# Patient Record
Sex: FEMALE | Race: WHITE
[De-identification: names, ages, dates, MRNs, and addresses within clinical notes are randomized per-mention and may not be internally consistent; named-entity substitution may affect disease eponyms.]

---

## 2018-02-28 ENCOUNTER — HOSPITAL ENCOUNTER (EMERGENCY)
Dept: HOSPITAL 25 - ED | Age: 48
Discharge: HOME | End: 2018-02-28
Payer: COMMERCIAL

## 2018-02-28 VITALS — DIASTOLIC BLOOD PRESSURE: 78 MMHG | SYSTOLIC BLOOD PRESSURE: 127 MMHG

## 2018-02-28 DIAGNOSIS — F17.200: ICD-10-CM

## 2018-02-28 DIAGNOSIS — R00.2: ICD-10-CM

## 2018-02-28 DIAGNOSIS — Z88.2: ICD-10-CM

## 2018-02-28 DIAGNOSIS — Z88.0: ICD-10-CM

## 2018-02-28 DIAGNOSIS — R07.9: ICD-10-CM

## 2018-02-28 DIAGNOSIS — R03.0: Primary | ICD-10-CM

## 2018-02-28 LAB
BASOPHILS # BLD AUTO: 0 10^3/UL (ref 0–0.2)
EOSINOPHIL # BLD AUTO: 0 10^3/UL (ref 0–0.6)
HCT VFR BLD AUTO: 48 % (ref 35–47)
HGB BLD-MCNC: 16.4 G/DL (ref 12–16)
INR PPP/BLD: 0.85 (ref 0.77–1.02)
LYMPHOCYTES # BLD AUTO: 1.1 10^3/UL (ref 1–4.8)
MCH RBC QN AUTO: 33 PG (ref 27–31)
MCHC RBC AUTO-ENTMCNC: 34 G/DL (ref 31–36)
MCV RBC AUTO: 96 FL (ref 80–97)
MONOCYTES # BLD AUTO: 0.3 10^3/UL (ref 0–0.8)
NEUTROPHILS # BLD AUTO: 3.6 10^3/UL (ref 1.5–7.7)
NRBC # BLD AUTO: 0 10^3/UL
NRBC BLD QL AUTO: 0
PLATELET # BLD AUTO: 135 10^3/UL (ref 150–450)
RBC # BLD AUTO: 4.99 10^6/UL (ref 4–5.4)
WBC # BLD AUTO: 5 10^3/UL (ref 3.5–10.8)

## 2018-02-28 PROCEDURE — 83690 ASSAY OF LIPASE: CPT

## 2018-02-28 PROCEDURE — 83735 ASSAY OF MAGNESIUM: CPT

## 2018-02-28 PROCEDURE — 80053 COMPREHEN METABOLIC PANEL: CPT

## 2018-02-28 PROCEDURE — 83605 ASSAY OF LACTIC ACID: CPT

## 2018-02-28 PROCEDURE — 85730 THROMBOPLASTIN TIME PARTIAL: CPT

## 2018-02-28 PROCEDURE — 71045 X-RAY EXAM CHEST 1 VIEW: CPT

## 2018-02-28 PROCEDURE — 96360 HYDRATION IV INFUSION INIT: CPT

## 2018-02-28 PROCEDURE — 85379 FIBRIN DEGRADATION QUANT: CPT

## 2018-02-28 PROCEDURE — 36415 COLL VENOUS BLD VENIPUNCTURE: CPT

## 2018-02-28 PROCEDURE — 93005 ELECTROCARDIOGRAM TRACING: CPT

## 2018-02-28 PROCEDURE — 85610 PROTHROMBIN TIME: CPT

## 2018-02-28 PROCEDURE — 83880 ASSAY OF NATRIURETIC PEPTIDE: CPT

## 2018-02-28 PROCEDURE — 99282 EMERGENCY DEPT VISIT SF MDM: CPT

## 2018-02-28 PROCEDURE — 84484 ASSAY OF TROPONIN QUANT: CPT

## 2018-02-28 PROCEDURE — 85025 COMPLETE CBC W/AUTO DIFF WBC: CPT

## 2018-02-28 PROCEDURE — 86140 C-REACTIVE PROTEIN: CPT

## 2018-02-28 PROCEDURE — 84443 ASSAY THYROID STIM HORMONE: CPT

## 2018-02-28 NOTE — RAD
HISTORY: Chest pain, palpitation



COMPARISONS: None



VIEWS: 1: frontal portable view of the chest at 11:20 AM



FINDINGS:

LINES AND TUBES: None.

CARDIOMEDIASTINAL SILHOUETTE: The cardiomediastinal silhouette is normal for portable

technique.

PLEURA: The costophrenic angles are sharp. No pleural abnormalities are noted.

LUNG PARENCHYMA: The lungs are clear.

ABDOMEN: The upper abdomen is clear. There is no subphrenic gas.

BONES AND SOFT TISSUES: No bone or soft tissue abnormalities are noted.



IMPRESSION: NO ACTIVE CARDIOPULMONARY DISEASE.

## 2018-02-28 NOTE — ED
Eleni VAZQUEZ Thomas, scribed for Frandy Shanks MD on 02/28/18 at 1052 .





HPI Chest Pain





- HPI Summary


HPI Summary: 





The patient is a 47 year old female who complains of dizziness, diaphoresis, 

lightheadedness, and feelings of faintness that began this morning at 09:00 

about two hours after she woke up. She measured her blood pressure and it was 

171/115. She decided to present to the emergency department, and in the ED she 

developed chest pressure. This morning at 01:30, she reports I woke up because 

my heart was going very fast. Then it abruptly stops. She was able to fall 

back asleep. She notes similar prior episodes of palpitations that are 

concurrent with chest pressure. The patient does admit that she has been 

stressed at work. Earlier today, she took ASA 81. She denies edema, cough, and 

nasal congestion. She is an active smoker. The patient notes pain for the last 

week to the region of a varicose vein on her left thigh after an incident with 

her cat. 





- History of Current Complaint


Chief Complaint: EDChestPainROMI


Time Seen by Provider: 02/28/18 10:34


Hx Obtained From: Patient


Onset/Duration: Started Minutes Ago, Still Present


Timing: Constant


Current Severity: Moderate


Pain Intensity: 0


Pain Scale Used: 0-10 Numeric


Character: Pressure/Squeezing


Aggravating Factor(s): Nothing


Alleviating Factor(s): Nothing


Associated Signs and Symptoms: Positive: Other: - CP, dizziness, diaphoresis, 

lightheadedness, faintness, palpitations; NEGATIVE: edema, cough, nasal 

congestion





- Allergy/Home Medications


Allergies/Adverse Reactions: 


 Allergies











Allergy/AdvReac Type Severity Reaction Status Date / Time


 


Penicillins Allergy  Hives Verified 02/28/18 10:26


 


Sulfa (Sulfonamide Allergy  Wheezing Verified 02/28/18 10:26





Antibiotics)     














PMH/Surg Hx/FS Hx/Imm Hx


Endocrine/Hematology History: 


   Denies: Hx Diabetes


Cardiovascular History: 


   Denies: Hx Hypertension


Infectious Disease History: No


Infectious Disease History: 


   Denies: Traveled Outside the [place] in Last 30 Days





- Family History


Known Family History: Positive: Hypertension





- Social History


Lives: With Family


Alcohol Use: Occasionally


Substance Use Type: Reports: None


Smoking Status (MU): Current Every Day Smoker





Review of Systems


Positive: Skin Diaphoresis, Other - Lightheadedness, faintness


Negative: Nasal Discharge


Positive: Palpitations, Other - Chest pressure.  Negative: Chest Pain


Negative: Cough


Negative: Edema


All Other Systems Reviewed And Are Negative: Yes





Physical Exam





- Summary


Physical Exam Summary: 





General: well-appearing, no pain distress


Skin: warm, color reflects adequate perfusion, dry


Head: normal


Eyes: EOMI, CHARLEEN


ENT: normal


Neck: supple, nontender


Respiratory: CTA, breath sounds present


Cardiovascular: Tachycardia, regular rhythm


Abdomen: soft, nontender


Bowel: present


Musculoskeletal: strength/ROM intact


Extremities: She has an abrasion to her left upper that that is swollen and 

tender to palpation. 


Neurological: normal, sensory/motor intact, A&O x3


Psychological: affect/mood appropriate


Triage Information Reviewed: Yes


Vital Signs On Initial Exam: 


 Initial Vitals











Temp Pulse Resp BP Pulse Ox


 


 97.8 F   115   14   162/100   100 


 


 02/28/18 10:22  02/28/18 10:22  02/28/18 10:22  02/28/18 10:22  02/28/18 10:22











Vital Signs Reviewed: Yes





Diagnostics





- Vital Signs


 Vital Signs











  Temp Pulse Resp BP Pulse Ox


 


 02/28/18 10:22  97.8 F  115  14  162/100  100














- Laboratory


Lab Results: 


 Lab Results











  02/28/18 02/28/18 02/28/18 Range/Units





  11:10 11:10 11:10 


 


WBC     (3.5-10.8)  10^3/ul


 


RBC     (4.0-5.4)  10^6/ul


 


Hgb     (12.0-16.0)  g/dl


 


Hct     (35-47)  %


 


MCV     (80-97)  fL


 


MCH     (27-31)  pg


 


MCHC     (31-36)  g/dl


 


RDW     (10.5-15)  %


 


Plt Count     (150-450)  10^3/ul


 


MPV     (7.4-10.4)  um3


 


Neut % (Auto)     (38-83)  %


 


Lymph % (Auto)     (25-47)  %


 


Mono % (Auto)     (0-7)  %


 


Eos % (Auto)     (0-6)  %


 


Baso % (Auto)     (0-2)  %


 


Absolute Neuts (auto)     (1.5-7.7)  10^3/ul


 


Absolute Lymphs (auto)     (1.0-4.8)  10^3/ul


 


Absolute Monos (auto)     (0-0.8)  10^3/ul


 


Absolute Eos (auto)     (0-0.6)  10^3/ul


 


Absolute Basos (auto)     (0-0.2)  10^3/ul


 


Absolute Nucleated RBC     10^3/ul


 


Nucleated RBC %     


 


INR (Anticoag Therapy)    0.85  (0.77-1.02)  


 


APTT    30.3  (26.0-36.3)  seconds


 


D-Dimer, Quantitative    < 200  (Less Than 230)  ng/mL


 


Sodium   138   (133-145)  mmol/L


 


Potassium   4.1   (3.5-5.0)  mmol/L


 


Chloride   102   (101-111)  mmol/L


 


Carbon Dioxide   31   (22-32)  mmol/L


 


Anion Gap   5   (2-11)  mmol/L


 


BUN   12   (6-24)  mg/dL


 


Creatinine   0.83   (0.51-0.95)  mg/dL


 


Est GFR ( Amer)   94.0   (>60)  


 


Est GFR (Non-Af Amer)   73.1   (>60)  


 


BUN/Creatinine Ratio   14.5   (8-20)  


 


Glucose   100   ()  mg/dL


 


Lactic Acid     (0.5-2.0)  mmol/L


 


Calcium   9.9   (8.6-10.3)  mg/dL


 


Magnesium   1.9   (1.9-2.7)  mg/dL


 


Total Bilirubin   0.50   (0.2-1.0)  mg/dL


 


AST   17   (13-39)  U/L


 


ALT   12   (7-52)  U/L


 


Alkaline Phosphatase   65   ()  U/L


 


Troponin I   0.00   (<0.04)  ng/mL


 


C-Reactive Protein   1.43   (< 5.00)  mg/L


 


B-Natriuretic Peptide  18   ( - 100) pg/mL


 


Total Protein   7.7   (6.4-8.9)  g/dL


 


Albumin   4.3   (3.2-5.2)  g/dL


 


Globulin   3.4   (2-4)  g/dL


 


Albumin/Globulin Ratio   1.3   (1-3)  


 


Lipase   42   (11.0-82.0)  U/L


 


TSH   1.29   (0.34-5.60)  mcIU/mL














  02/28/18 02/28/18 Range/Units





  11:10 11:10 


 


WBC  5.0   (3.5-10.8)  10^3/ul


 


RBC  4.99   (4.0-5.4)  10^6/ul


 


Hgb  16.4 H   (12.0-16.0)  g/dl


 


Hct  48 H   (35-47)  %


 


MCV  96   (80-97)  fL


 


MCH  33 H   (27-31)  pg


 


MCHC  34   (31-36)  g/dl


 


RDW  14   (10.5-15)  %


 


Plt Count  135 L   (150-450)  10^3/ul


 


MPV  9   (7.4-10.4)  um3


 


Neut % (Auto)  70.9   (38-83)  %


 


Lymph % (Auto)  21.4 L   (25-47)  %


 


Mono % (Auto)  6.5   (0-7)  %


 


Eos % (Auto)  0.6   (0-6)  %


 


Baso % (Auto)  0.6   (0-2)  %


 


Absolute Neuts (auto)  3.6   (1.5-7.7)  10^3/ul


 


Absolute Lymphs (auto)  1.1   (1.0-4.8)  10^3/ul


 


Absolute Monos (auto)  0.3   (0-0.8)  10^3/ul


 


Absolute Eos (auto)  0   (0-0.6)  10^3/ul


 


Absolute Basos (auto)  0   (0-0.2)  10^3/ul


 


Absolute Nucleated RBC  0   10^3/ul


 


Nucleated RBC %  0   


 


INR (Anticoag Therapy)    (0.77-1.02)  


 


APTT    (26.0-36.3)  seconds


 


D-Dimer, Quantitative    (Less Than 230)  ng/mL


 


Sodium    (133-145)  mmol/L


 


Potassium    (3.5-5.0)  mmol/L


 


Chloride    (101-111)  mmol/L


 


Carbon Dioxide    (22-32)  mmol/L


 


Anion Gap    (2-11)  mmol/L


 


BUN    (6-24)  mg/dL


 


Creatinine    (0.51-0.95)  mg/dL


 


Est GFR ( Amer)    (>60)  


 


Est GFR (Non-Af Amer)    (>60)  


 


BUN/Creatinine Ratio    (8-20)  


 


Glucose    ()  mg/dL


 


Lactic Acid   1.6  (0.5-2.0)  mmol/L


 


Calcium    (8.6-10.3)  mg/dL


 


Magnesium    (1.9-2.7)  mg/dL


 


Total Bilirubin    (0.2-1.0)  mg/dL


 


AST    (13-39)  U/L


 


ALT    (7-52)  U/L


 


Alkaline Phosphatase    ()  U/L


 


Troponin I    (<0.04)  ng/mL


 


C-Reactive Protein    (< 5.00)  mg/L


 


B-Natriuretic Peptide   ( - 100) pg/mL


 


Total Protein    (6.4-8.9)  g/dL


 


Albumin    (3.2-5.2)  g/dL


 


Globulin    (2-4)  g/dL


 


Albumin/Globulin Ratio    (1-3)  


 


Lipase    (11.0-82.0)  U/L


 


TSH    (0.34-5.60)  mcIU/mL











Result Diagrams: 


 02/28/18 11:10





 02/28/18 11:10


Lab Statement: Any lab studies that have been ordered have been reviewed, and 

results considered in the medical decision making process.





- Radiology


  ** CXR


Xray Interpretation: No Acute Changes


Radiology Interpretation Completed By: Radiologist





- EKG


  ** 10:27


Cardiac Rate: Tachycardia - at 109 BPM


EKG Rhythm: Sinus Tachycardia


EKG Interpretation: T-wave flattening in anterior leads. QTc is 508. 





- Additional Comments


Diagnostic Additional Comments: 





ULTRASOUND LOWER EXT VEINS LEFT


INTERPRETED BY RADIOLOGIST


IMPRESSION: NO LEFT LOWER EXTREMITY DEEP VEIN THROMBOSIS


DR. SHANKS HAS REVIEWED THIS REPORT. 








Chest Pain Course/Dx





- Course


Course Of Treatment: Medications reviewed. Allergies noted. BP noted and 

patient urged to follow up with primary care.  PATIENT IMPROVED IN ED.  RESULTS 

WERE DISCUSSED WITH PATIENT AND .  WE DISCUSSED ADMISSION FOR THE CHEST 

PAIN AND FURTHER EVALUATION.  PATIENT DECLINED ADMISSION.  WE DISCUSSED 

RETURNING TO THE ED IF SX RETURNED; SHE AGREED.





- Diagnoses


Provider Diagnoses: 


 Elevated BP without diagnosis of hypertension, Chest pain, Palpitations








Discharge





- Discharge Plan


Condition: Stable


Disposition: HOME


Patient Education Materials:  Chest Pain (ED), Heart Palpitations (ED), 

Hypertension (ED)


Referrals: 


[facility] [place] PHYSICIAN REFERRAL [Outside]


Additional Instructions: 


FOLLOW UP WITH YOUR DOCTOR.


RETURN TO THE EMERGENCY DEPARTMENT FOR ANY WORSENING OF YOUR CONDITION; CHEST 

PAIN, SHORTNESS OF BREATH, YOU FEEL ILL OR QUESTIONS OR CONCERNS.


YOUR BLOOD PRESSURE WAS ELEVATED TODAY; FOLLOW UP WITH YOUR PRIMARY CARE DOCTOR 

WITHIN THE NEXT 1 WEEK.





The documentation as recorded by the Eleni gonzales Thomas accurately reflects 

the service I personally performed and the decisions made by me, Frandy Shanks MD.

## 2018-02-28 NOTE — RAD
HISTORY: Chest pain, left leg swelling and tenderness



COMPARISONS: None relevant



TECHNIQUE: Multiple transverse and longitudinal ultrasound images were obtained of the

left lower extremity from the level of the common femoral vein inferiorly through to the

infrapopliteal veins  using grayscale, color Doppler, and spectral Doppler imaging with

and without compression and with augmentation. Comparison images were obtained of the

contralateral common femoral vein.



FINDINGS:

VEINS: The venous system of the left lower extremity is compressible throughout its

course, with normal flow on color Doppler imaging and normal response to augmentation on

spectral Doppler imaging.



SOFT TISSUES: Unremarkable.



OTHER FINDINGS: None.



IMPRESSION: 

NO LEFT LOWER EXTREMITY DEEP VEIN THROMBOSIS